# Patient Record
Sex: FEMALE | Race: WHITE | NOT HISPANIC OR LATINO | Employment: STUDENT | URBAN - METROPOLITAN AREA
[De-identification: names, ages, dates, MRNs, and addresses within clinical notes are randomized per-mention and may not be internally consistent; named-entity substitution may affect disease eponyms.]

---
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
## 2023-12-10 ENCOUNTER — OFFICE VISIT (OUTPATIENT)
Dept: URGENT CARE | Facility: MEDICAL CENTER | Age: 18
End: 2023-12-10
Payer: COMMERCIAL

## 2023-12-10 VITALS
DIASTOLIC BLOOD PRESSURE: 84 MMHG | RESPIRATION RATE: 18 BRPM | TEMPERATURE: 98.8 F | HEART RATE: 111 BPM | SYSTOLIC BLOOD PRESSURE: 147 MMHG | OXYGEN SATURATION: 98 %

## 2023-12-10 DIAGNOSIS — J02.9 SORE THROAT: Primary | ICD-10-CM

## 2023-12-10 DIAGNOSIS — B34.9 VIRAL ILLNESS: ICD-10-CM

## 2023-12-10 PROCEDURE — G0382 LEV 3 HOSP TYPE B ED VISIT: HCPCS | Performed by: NURSE PRACTITIONER

## 2023-12-10 NOTE — PROGRESS NOTES
NAME: Apple Zambrano is a 21 y.o. female  : 2005    MRN: [id number]    /84   Pulse (!) 111   Temp 98.8 °F (37.1 °C)   Resp 18   SpO2 98%     3:14 PM    Assessment and Plan   Sore throat [J02.9]  1. Sore throat        2. Viral illness            Caryn Lloyd was seen today for cough. Diagnoses and all orders for this visit:    Sore throat    Viral illness        Patient Instructions   There are no Patient Instructions on file for this visit. Proceed to the nearest ER if symptoms worsen, Follow up with your PCP  Continue to social distance, wash your hands, and wear your masks. Please continue to follow the CDC. gov guidelines daily for they are subject to change on COVID-19    Chief Complaint     Chief Complaint   Patient presents with    Cough     Patient c/o cough and swollen throat, and swollen tongue          History of Present Illness       Pt has come in today with c/o of possible allergic reaction to various things in the past year, however in the past few days pt believes she had another one and only happens when she goes out side. She is concerened that she may have an illness and is very anxious in the room today. She has no swelling of the tongue today but feels like maybe something in her throat, not feeling like a lump in her throat but feels that there is something there as if it was closing and sometimes feels winded. Pt is speaking fast and talking quickly without exertion noted. Review of Systems   Review of Systems   Constitutional:  Negative for chills, fatigue and fever. HENT: Negative. Negative for sore throat. Eyes: Negative. Respiratory: Negative. Cardiovascular: Negative. Gastrointestinal: Negative. Genitourinary: Negative. Musculoskeletal: Negative. Negative for back pain and gait problem. Skin: Negative. Psychiatric/Behavioral:  The patient is nervous/anxious.           Current Medications       Current Outpatient Medications: loratadine (CLARITIN) 5 MG chewable tablet, Chew 5 mg daily, Disp: , Rfl:     Multiple Vitamin (MULTIVITAMIN ADULT PO), Take by mouth, Disp: , Rfl:     Current Allergies     Allergies as of 12/10/2023 - Reviewed 10/23/2021   Allergen Reaction Noted    Latex Hives 03/03/2015              No past medical history on file. Past Surgical History:   Procedure Laterality Date    TONSILLECTOMY         No family history on file. Medications have been verified. The following portions of the patient's history were reviewed and updated as appropriate: allergies, current medications, past family history, past medical history, past social history, past surgical history and problem list.    Objective   /84   Pulse (!) 111   Temp 98.8 °F (37.1 °C)   Resp 18   SpO2 98%      Physical Exam     Physical Exam  Constitutional:       General: She is in acute distress (anxious). Appearance: Normal appearance. HENT:      Right Ear: Tympanic membrane normal. There is no impacted cerumen. Left Ear: Tympanic membrane normal. There is no impacted cerumen. Nose: Nose normal. No congestion or rhinorrhea. Mouth/Throat:      Mouth: Mucous membranes are moist.      Pharynx: No oropharyngeal exudate or posterior oropharyngeal erythema. Eyes:      Pupils: Pupils are equal, round, and reactive to light. Cardiovascular:      Rate and Rhythm: Regular rhythm. Tachycardia present. Pulses: Normal pulses. Heart sounds: Normal heart sounds. Pulmonary:      Effort: Pulmonary effort is normal.      Breath sounds: Normal breath sounds. Abdominal:      General: Abdomen is flat. Musculoskeletal:         General: Normal range of motion. Skin:     General: Skin is warm. Neurological:      General: No focal deficit present. Mental Status: She is alert and oriented to person, place, and time. Cranial Nerves: No cranial nerve deficit.       Comments: Anxious               Note: Portions of this record may have been created with voice recognition software. Occasional wrong word or "sound a like" substitutions may have occurred due to the inherent limitations of voice recognition software. Please read the chart carefully and recognize, using context, where substitutions have occurred. OSCAR Contreras

## 2025-04-20 ENCOUNTER — OFFICE VISIT (OUTPATIENT)
Dept: URGENT CARE | Facility: MEDICAL CENTER | Age: 20
End: 2025-04-20
Payer: COMMERCIAL

## 2025-04-20 VITALS
RESPIRATION RATE: 18 BRPM | DIASTOLIC BLOOD PRESSURE: 77 MMHG | OXYGEN SATURATION: 97 % | TEMPERATURE: 97.9 F | SYSTOLIC BLOOD PRESSURE: 121 MMHG | HEART RATE: 71 BPM

## 2025-04-20 DIAGNOSIS — B97.89 ACUTE VIRAL SINUSITIS: Primary | ICD-10-CM

## 2025-04-20 DIAGNOSIS — J01.90 ACUTE VIRAL SINUSITIS: Primary | ICD-10-CM

## 2025-04-20 PROCEDURE — G0382 LEV 3 HOSP TYPE B ED VISIT: HCPCS | Performed by: NURSE PRACTITIONER

## 2025-04-20 RX ORDER — BROMPHENIRAMINE MALEATE, PSEUDOEPHEDRINE HYDROCHLORIDE, AND DEXTROMETHORPHAN HYDROBROMIDE 2; 30; 10 MG/5ML; MG/5ML; MG/5ML
10 SYRUP ORAL 3 TIMES DAILY PRN
Qty: 150 ML | Refills: 0 | Status: SHIPPED | OUTPATIENT
Start: 2025-04-20

## 2025-04-20 RX ORDER — FLUTICASONE PROPIONATE 50 MCG
2 SPRAY, SUSPENSION (ML) NASAL DAILY
COMMUNITY

## 2025-04-20 RX ORDER — CETIRIZINE HYDROCHLORIDE 10 MG/1
10 TABLET, CHEWABLE ORAL DAILY
COMMUNITY

## 2025-04-20 RX ORDER — PREDNISONE 20 MG/1
40 TABLET ORAL DAILY
Qty: 6 TABLET | Refills: 0 | Status: SHIPPED | OUTPATIENT
Start: 2025-04-20 | End: 2025-04-23

## 2025-04-20 NOTE — PROGRESS NOTES
[facility]        NAME: Almas Strong is a 19 y.o. female  : 2005    MRN: [id number]  DATE: 2025  TIME: 1:06 PM    Assessment and Plan   Acute viral sinusitis [J01.90, B97.89]  1. Acute viral sinusitis  brompheniramine-pseudoephedrine-DM 30-2-10 MG/5ML syrup    predniSONE 20 mg tablet            Patient Instructions     Take meds as prescribed   Likely viral URI  Follow up with PCP in 3-5 days.  Proceed to  ER if symptoms worsen.    If tests have been performed at [facility], our office will contact you with results if changes need to be made to the care plan discussed with you at the visit.  [facility]    Chief Complaint     Chief Complaint   Patient presents with    Nasal Congestion     Patient c/o nasal congestion with pressure and right ear pain x 2 days          History of Present Illness       HPI  Reports nose congestion, with sinus pressure and right ear pain that radiates down the right jaw line     Review of Systems   Review of Systems   Constitutional:  Negative for fever.   HENT:  Positive for congestion and ear pain (right ear). Negative for rhinorrhea and sore throat.    Respiratory:  Negative for cough, chest tightness, shortness of breath and wheezing.    Cardiovascular:  Negative for chest pain.   Gastrointestinal:  Negative for diarrhea and vomiting.   Neurological:  Positive for headaches.         Current Medications       Current Outpatient Medications:     brompheniramine-pseudoephedrine-DM 30-2-10 MG/5ML syrup, Take 10 mL by mouth 3 (three) times a day as needed for allergies, cough or congestion, Disp: 150 mL, Rfl: 0    predniSONE 20 mg tablet, Take 2 tablets (40 mg total) by mouth daily for 3 days, Disp: 6 tablet, Rfl: 0    cetirizine (ZyrTEC) 10 MG chewable tablet, Chew 10 mg daily, Disp: , Rfl:     fluticasone (FLONASE) 50 mcg/act nasal spray, 2 sprays into each nostril daily, Disp: , Rfl:     loratadine (CLARITIN) 5 MG  chewable tablet, Chew 5 mg daily, Disp: , Rfl:     Multiple Vitamin (MULTIVITAMIN ADULT PO), Take by mouth, Disp: , Rfl:     Current Allergies     Allergies as of 04/20/2025 - Reviewed 10/23/2021   Allergen Reaction Noted    Latex Hives 03/03/2015            The following portions of the patient's history were reviewed and updated as appropriate: allergies, current medications, past family history, past medical history, past social history, past surgical history and problem list.     No past medical history on file.    Past Surgical History:   Procedure Laterality Date    TONSILLECTOMY         No family history on file.      Medications have been verified.        Objective   /77   Pulse 71   Temp 97.9 °F (36.6 °C)   Resp 18   SpO2 97%   No LMP recorded.       Physical Exam     Physical Exam  Constitutional:       General: She is not in acute distress.  HENT:      Head:      Comments: Mild TTP of the axillary sinuses      Right Ear: Tympanic membrane normal.      Left Ear: Tympanic membrane normal.      Nose: Rhinorrhea present.      Mouth/Throat:      Pharynx: No posterior oropharyngeal erythema.      Comments: Mild post nasal drip  Cardiovascular:      Rate and Rhythm: Regular rhythm.      Heart sounds: Normal heart sounds.   Pulmonary:      Effort: Pulmonary effort is normal.      Breath sounds: Normal breath sounds.